# Patient Record
Sex: MALE | Race: WHITE | NOT HISPANIC OR LATINO | Employment: OTHER | URBAN - METROPOLITAN AREA
[De-identification: names, ages, dates, MRNs, and addresses within clinical notes are randomized per-mention and may not be internally consistent; named-entity substitution may affect disease eponyms.]

---

## 2018-03-08 NOTE — PATIENT DISCUSSION
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 Only the part of the retina drained by this branch vein is damaged.  Vision loss varies.

## 2018-03-08 NOTE — PATIENT DISCUSSION
Carotid study, Retinal vein and artery dopplers order given to pt for [facility] AT [facility] with Dr. Cali Cervantes.

## 2018-03-08 NOTE — PATIENT DISCUSSION
Pt stated he has recently lost about 40lbs over the last year.  Encouraged pt to continue with the weight loss.

## 2018-03-27 ENCOUNTER — NEW PATIENT COMPREHENSIVE (OUTPATIENT)
Dept: URBAN - METROPOLITAN AREA CLINIC 39 | Facility: CLINIC | Age: 63
End: 2018-03-27

## 2018-03-27 VITALS
DIASTOLIC BLOOD PRESSURE: 87 MMHG | SYSTOLIC BLOOD PRESSURE: 129 MMHG | HEART RATE: 71 BPM | HEIGHT: 60 IN | RESPIRATION RATE: 14 BRPM

## 2018-03-27 DIAGNOSIS — H18.51: ICD-10-CM

## 2018-03-27 DIAGNOSIS — H25.811: ICD-10-CM

## 2018-03-27 DIAGNOSIS — H25.812: ICD-10-CM

## 2018-03-27 PROCEDURE — 99204 OFFICE O/P NEW MOD 45 MIN: CPT

## 2018-03-27 PROCEDURE — 92136TC INTERFEROMETRY - TECHNICAL COMPONENT

## 2018-03-27 PROCEDURE — 92015 DETERMINE REFRACTIVE STATE: CPT

## 2018-03-27 PROCEDURE — 92025 CPTRIZED CORNEAL TOPOGRAPHY: CPT

## 2018-03-27 RX ORDER — PREDNISOLONE ACETATE 10 MG/ML: 1 SUSPENSION/ DROPS OPHTHALMIC

## 2018-03-27 RX ORDER — MOXIFLOXACIN HYDROCHLORIDE 5 MG/ML: 1 SOLUTION/ DROPS OPHTHALMIC

## 2018-03-27 RX ORDER — NEPAFENAC 3 MG/ML: 1 SUSPENSION/ DROPS OPHTHALMIC ONCE A DAY

## 2018-03-27 ASSESSMENT — VISUAL ACUITY
OD_SC: < 12
OD_SC: 20/400
OS_GLARE: 20/400
OS_CC: 20/50-1
OS_SC: < 12
OD_CC: J8
OS_SC: 20/70
OD_CC: 20/100+1
OS_CC: J5
OD_GLARE: 20/400

## 2018-03-27 ASSESSMENT — TONOMETRY
OD_IOP_MMHG: 15
OS_IOP_MMHG: 15

## 2018-04-19 NOTE — PATIENT DISCUSSION
Could be due to shift in cataract and possible need for new Rx glasses. Recommend coming in for refraction with [facility] in a few weeks due to OD not available.

## 2018-04-19 NOTE — PATIENT DISCUSSION
Pt edu there is no evidence of edema on OCT or Exam today. Need to get to the bottom as to why vision has gone down, could be due to the need for new Glasses Rx. Will follow up in 1 month for OCT/Possible FA again to assess if there is any more damage from the vein blockage. Edu patient that the retina was damaged from when the vein was blocked so will have some vision loss.

## 2018-05-16 ENCOUNTER — SURGERY/PROCEDURE (OUTPATIENT)
Dept: URBAN - METROPOLITAN AREA CLINIC 39 | Facility: CLINIC | Age: 63
End: 2018-05-16

## 2018-05-16 DIAGNOSIS — T85.398A: ICD-10-CM

## 2018-05-16 DIAGNOSIS — H25.811: ICD-10-CM

## 2018-05-16 DIAGNOSIS — H18.51: ICD-10-CM

## 2018-05-16 PROCEDURE — 66250 FOLLOW-UP SURGERY OF EYE: CPT

## 2018-05-16 PROCEDURE — 66020 INJECTION TREATMENT OF EYE: CPT

## 2018-05-16 PROCEDURE — 65756 CORNEAL TRNSPL ENDOTHELIAL: CPT

## 2018-05-16 PROCEDURE — 66984 XCAPSL CTRC RMVL W/O ECP: CPT

## 2018-05-17 ENCOUNTER — CATARACT POST-OP 1-DAY (OUTPATIENT)
Dept: URBAN - METROPOLITAN AREA CLINIC 39 | Facility: CLINIC | Age: 63
End: 2018-05-17

## 2018-05-17 DIAGNOSIS — Z94.7: ICD-10-CM

## 2018-05-17 PROCEDURE — 99024 POSTOP FOLLOW-UP VISIT: CPT

## 2018-05-17 ASSESSMENT — TONOMETRY
OD_IOP_MMHG: 11
OS_IOP_MMHG: 10

## 2018-05-17 ASSESSMENT — VISUAL ACUITY
OS_PH: 20/40
OD_SC: CF 1FT
OS_SC: 20/60
OD_PH: 20/200

## 2018-05-17 NOTE — PATIENT DISCUSSION
Pt edu stable, no blood or fluid. No treatment indicated at this point. Discussed to importance of BP med compliance and following up with PCP to manage BP. S/S of worsening discussed. Advised to call with any vision changes.

## 2018-05-17 NOTE — PATIENT DISCUSSION
Pt edu main reason for decreased/poor vision in OS is due to the fact that he needs a new Rx. Refraction today brought his vision from 20/200 to 20/40. Recommend next available with [facility] for possible change in Rx glasses.

## 2018-05-22 ENCOUNTER — POST-OP CATARACT (OUTPATIENT)
Dept: URBAN - METROPOLITAN AREA CLINIC 39 | Facility: CLINIC | Age: 63
End: 2018-05-22

## 2018-05-22 DIAGNOSIS — Z94.7: ICD-10-CM

## 2018-05-22 PROCEDURE — 99024 POSTOP FOLLOW-UP VISIT: CPT

## 2018-05-22 ASSESSMENT — VISUAL ACUITY
OD_PH: 20/100-1
OS_SC: J12
OS_SC: 20/60
OD_SC: <J12
OD_SC: 20/200+1

## 2018-05-22 ASSESSMENT — TONOMETRY
OD_IOP_MMHG: 11
OS_IOP_MMHG: 11

## 2018-07-09 ENCOUNTER — POST OP/EVAL OF SECOND EYE (OUTPATIENT)
Dept: URBAN - METROPOLITAN AREA CLINIC 39 | Facility: CLINIC | Age: 63
End: 2018-07-09

## 2018-07-09 DIAGNOSIS — Z94.7: ICD-10-CM

## 2018-07-09 DIAGNOSIS — H25.812: ICD-10-CM

## 2018-07-09 DIAGNOSIS — H18.51: ICD-10-CM

## 2018-07-09 PROCEDURE — 92012 INTRM OPH EXAM EST PATIENT: CPT

## 2018-07-09 RX ORDER — FLUOROMETHOLONE 1 MG/ML
1 SOLUTION/ DROPS OPHTHALMIC ONCE A DAY
Start: 2018-07-09

## 2018-07-09 ASSESSMENT — VISUAL ACUITY
OD_SC: J8
OD_PH: 20/25
OD_SC: 20/50
OS_PH: 20/40
OS_SC: 20/80
OS_SC: <J12
OS_BAT: 20/400

## 2018-07-09 ASSESSMENT — TONOMETRY
OS_IOP_MMHG: 12
OD_IOP_MMHG: 10

## 2018-07-11 ENCOUNTER — SURGERY/PROCEDURE (OUTPATIENT)
Dept: URBAN - METROPOLITAN AREA CLINIC 39 | Facility: CLINIC | Age: 63
End: 2018-07-11

## 2018-07-11 DIAGNOSIS — H18.51: ICD-10-CM

## 2018-07-11 DIAGNOSIS — H25.812: ICD-10-CM

## 2018-07-11 PROCEDURE — 66984 XCAPSL CTRC RMVL W/O ECP: CPT

## 2018-07-11 PROCEDURE — 65756 CORNEAL TRNSPL ENDOTHELIAL: CPT

## 2018-07-12 ENCOUNTER — CATARACT POST-OP 1-DAY (OUTPATIENT)
Dept: URBAN - METROPOLITAN AREA CLINIC 39 | Facility: CLINIC | Age: 63
End: 2018-07-12

## 2018-07-12 DIAGNOSIS — Z94.7: ICD-10-CM

## 2018-07-12 PROCEDURE — 99024 POSTOP FOLLOW-UP VISIT: CPT

## 2018-07-12 ASSESSMENT — TONOMETRY
OD_IOP_MMHG: 11
OS_IOP_MMHG: 17

## 2018-07-12 ASSESSMENT — VISUAL ACUITY: OD_SC: 20/50

## 2018-07-17 ENCOUNTER — POST-OP (OUTPATIENT)
Dept: URBAN - METROPOLITAN AREA CLINIC 39 | Facility: CLINIC | Age: 63
End: 2018-07-17

## 2018-07-17 DIAGNOSIS — H18.51: ICD-10-CM

## 2018-07-17 DIAGNOSIS — Z94.7: ICD-10-CM

## 2018-07-17 PROCEDURE — 99024 POSTOP FOLLOW-UP VISIT: CPT

## 2018-07-17 PROCEDURE — 92025 CPTRIZED CORNEAL TOPOGRAPHY: CPT

## 2018-07-17 PROCEDURE — 92286 ANT SGM IMG I&R SPECLR MIC: CPT

## 2018-07-17 PROCEDURE — 92134 CPTRZ OPH DX IMG PST SGM RTA: CPT

## 2018-07-17 ASSESSMENT — TONOMETRY
OS_IOP_MMHG: 13
OD_IOP_MMHG: 12

## 2018-07-17 ASSESSMENT — VISUAL ACUITY
OD_SC: J12
OS_SC: J12
OS_SC: 20/100
OU_SC: 20/50-1
OD_SC: 20/50-1

## 2018-10-30 ENCOUNTER — FOLLOW UP (OUTPATIENT)
Dept: URBAN - METROPOLITAN AREA CLINIC 39 | Facility: CLINIC | Age: 63
End: 2018-10-30

## 2018-10-30 DIAGNOSIS — H04.123: ICD-10-CM

## 2018-10-30 DIAGNOSIS — Z94.7: ICD-10-CM

## 2018-10-30 DIAGNOSIS — H26.493: ICD-10-CM

## 2018-10-30 DIAGNOSIS — H18.51: ICD-10-CM

## 2018-10-30 PROCEDURE — 92286 ANT SGM IMG I&R SPECLR MIC: CPT

## 2018-10-30 PROCEDURE — 92012 INTRM OPH EXAM EST PATIENT: CPT

## 2018-10-30 ASSESSMENT — VISUAL ACUITY
OS_GLARE: 20/60
OD_SC: 20/30
OS_SC: 20/40+1
OD_GLARE: 20/70

## 2018-10-30 ASSESSMENT — TONOMETRY
OS_IOP_MMHG: 12
OD_IOP_MMHG: 10

## 2018-11-15 ENCOUNTER — SURGERY/PROCEDURE (OUTPATIENT)
Dept: URBAN - METROPOLITAN AREA SURGERY 14 | Facility: SURGERY | Age: 63
End: 2018-11-15

## 2018-11-15 DIAGNOSIS — H26.493: ICD-10-CM

## 2018-11-15 PROCEDURE — 6682150 YAG CAPSULOTOMY

## 2018-11-19 ENCOUNTER — YAG POST-OP (OUTPATIENT)
Dept: URBAN - METROPOLITAN AREA CLINIC 39 | Facility: CLINIC | Age: 63
End: 2018-11-19

## 2018-11-19 DIAGNOSIS — Z98.890: ICD-10-CM

## 2018-11-19 PROCEDURE — 99024 POSTOP FOLLOW-UP VISIT: CPT

## 2018-11-19 ASSESSMENT — TONOMETRY
OS_IOP_MMHG: 12
OD_IOP_MMHG: 12

## 2018-11-19 ASSESSMENT — VISUAL ACUITY
OD_SC: 20/30-2
OS_CC: J2
OS_SC: 20/40-1
OD_CC: J3
OS_SC: J5
OD_SC: J10

## 2019-11-20 ENCOUNTER — ESTABLISHED COMPREHENSIVE EXAM (OUTPATIENT)
Dept: URBAN - METROPOLITAN AREA CLINIC 39 | Facility: CLINIC | Age: 64
End: 2019-11-20

## 2019-11-20 DIAGNOSIS — H04.123: ICD-10-CM

## 2019-11-20 DIAGNOSIS — H43.813: ICD-10-CM

## 2019-11-20 PROCEDURE — 92015 DETERMINE REFRACTIVE STATE: CPT

## 2019-11-20 PROCEDURE — 92014 COMPRE OPH EXAM EST PT 1/>: CPT

## 2019-11-20 ASSESSMENT — VISUAL ACUITY
OD_SC: 20/40+2
OS_CC: J1
OD_SC: <J12
OS_SC: 20/50-1
OU_SC: 20/30-2
OD_CC: J3
OS_SC: J3
OU_CC: J1
OU_SC: J2

## 2019-11-20 ASSESSMENT — TONOMETRY
OD_IOP_MMHG: 13
OS_IOP_MMHG: 13

## 2020-06-18 NOTE — PATIENT DISCUSSION
*******Exam not completed due to patient coughing excessively Per: [facility] pt to cancel today and reschedule once Covid testing has been done. *********.

## 2020-11-25 ENCOUNTER — ESTABLISHED COMPREHENSIVE EXAM (OUTPATIENT)
Dept: URBAN - METROPOLITAN AREA CLINIC 39 | Facility: CLINIC | Age: 65
End: 2020-11-25

## 2020-11-25 DIAGNOSIS — H02.834: ICD-10-CM

## 2020-11-25 DIAGNOSIS — H43.813: ICD-10-CM

## 2020-11-25 DIAGNOSIS — H52.4: ICD-10-CM

## 2020-11-25 DIAGNOSIS — H04.123: ICD-10-CM

## 2020-11-25 DIAGNOSIS — H52.203: ICD-10-CM

## 2020-11-25 DIAGNOSIS — H52.01: ICD-10-CM

## 2020-11-25 DIAGNOSIS — H02.831: ICD-10-CM

## 2020-11-25 PROCEDURE — 92015 DETERMINE REFRACTIVE STATE: CPT

## 2020-11-25 PROCEDURE — 92014 COMPRE OPH EXAM EST PT 1/>: CPT

## 2020-11-25 PROCEDURE — 92499OP2 OPTOMAP RETINAL SCREENING BOTH EYES

## 2020-11-25 ASSESSMENT — TONOMETRY
OD_IOP_MMHG: 12
OS_IOP_MMHG: 13

## 2020-11-25 ASSESSMENT — VISUAL ACUITY
OS_SC: J3
OD_SC: J10
OU_SC: 20/40+2
OD_CC: J2
OD_SC: 20/40+2
OS_CC: J1+
OS_SC: 20/50-2

## 2021-02-03 NOTE — PATIENT DISCUSSION
We reviewed the signs and symptoms of retinal tear/retinal detachment and the importance of prompt evaluation should there be increasing floaters, new flashing lights, or decreasing peripheral vision in either eye at any time. Thalidomide Pregnancy And Lactation Text: This medication is Pregnancy Category X and is absolutely contraindicated during pregnancy. It is unknown if it is excreted in breast milk.

## 2021-07-07 NOTE — PATIENT DISCUSSION
Low risk for malignant transformation. Lesion appears to have been present for many years. Satisfactory

## 2022-01-07 ENCOUNTER — COMPREHENSIVE EXAM (OUTPATIENT)
Dept: URBAN - METROPOLITAN AREA CLINIC 39 | Facility: CLINIC | Age: 67
End: 2022-01-07

## 2022-01-07 DIAGNOSIS — H52.4: ICD-10-CM

## 2022-01-07 DIAGNOSIS — H52.203: ICD-10-CM

## 2022-01-07 DIAGNOSIS — H52.01: ICD-10-CM

## 2022-01-07 DIAGNOSIS — H43.813: ICD-10-CM

## 2022-01-07 DIAGNOSIS — H52.12: ICD-10-CM

## 2022-01-07 DIAGNOSIS — H02.834: ICD-10-CM

## 2022-01-07 DIAGNOSIS — H04.123: ICD-10-CM

## 2022-01-07 DIAGNOSIS — H02.831: ICD-10-CM

## 2022-01-07 PROCEDURE — 92014 COMPRE OPH EXAM EST PT 1/>: CPT

## 2022-01-07 PROCEDURE — 92015 DETERMINE REFRACTIVE STATE: CPT

## 2022-01-07 ASSESSMENT — TONOMETRY
OS_IOP_MMHG: 13
OD_IOP_MMHG: 15

## 2022-01-07 ASSESSMENT — VISUAL ACUITY
OD_SC: 20/30-2
OD_SC: J10
OS_SC: J3
OS_SC: 20/40+1
OS_CC: J1
OD_CC: J3

## 2023-01-06 ENCOUNTER — COMPREHENSIVE EXAM (OUTPATIENT)
Dept: URBAN - METROPOLITAN AREA CLINIC 39 | Facility: CLINIC | Age: 68
End: 2023-01-06

## 2023-01-06 DIAGNOSIS — H52.01: ICD-10-CM

## 2023-01-06 DIAGNOSIS — H52.12: ICD-10-CM

## 2023-01-06 DIAGNOSIS — H52.203: ICD-10-CM

## 2023-01-06 DIAGNOSIS — H02.834: ICD-10-CM

## 2023-01-06 DIAGNOSIS — H02.831: ICD-10-CM

## 2023-01-06 DIAGNOSIS — H43.813: ICD-10-CM

## 2023-01-06 DIAGNOSIS — H04.123: ICD-10-CM

## 2023-01-06 DIAGNOSIS — H52.4: ICD-10-CM

## 2023-01-06 PROCEDURE — 92014 COMPRE OPH EXAM EST PT 1/>: CPT

## 2023-01-06 PROCEDURE — 92015 DETERMINE REFRACTIVE STATE: CPT

## 2023-01-06 ASSESSMENT — VISUAL ACUITY
OD_SC: 20/20-1
OS_SC: 20/20-1
OD_SC: J12
OS_SC: J6

## 2023-01-06 ASSESSMENT — TONOMETRY
OD_IOP_MMHG: 12
OS_IOP_MMHG: 14

## 2024-01-09 ENCOUNTER — COMPREHENSIVE EXAM (OUTPATIENT)
Dept: URBAN - METROPOLITAN AREA CLINIC 39 | Facility: CLINIC | Age: 69
End: 2024-01-09

## 2024-01-09 DIAGNOSIS — H02.831: ICD-10-CM

## 2024-01-09 DIAGNOSIS — H52.4: ICD-10-CM

## 2024-01-09 DIAGNOSIS — H43.813: ICD-10-CM

## 2024-01-09 DIAGNOSIS — H52.01: ICD-10-CM

## 2024-01-09 DIAGNOSIS — H04.123: ICD-10-CM

## 2024-01-09 DIAGNOSIS — H52.12: ICD-10-CM

## 2024-01-09 DIAGNOSIS — H52.203: ICD-10-CM

## 2024-01-09 DIAGNOSIS — H02.834: ICD-10-CM

## 2024-01-09 PROCEDURE — 92015 DETERMINE REFRACTIVE STATE: CPT

## 2024-01-09 PROCEDURE — 92014 COMPRE OPH EXAM EST PT 1/>: CPT

## 2024-01-09 ASSESSMENT — VISUAL ACUITY
OD_SC: 20/20
OS_SC: J1+
OU_SC: 20/20
OS_SC: 20/40-1
OU_SC: J1+
OS_PH: 20/20
OD_SC: J12

## 2024-01-09 ASSESSMENT — TONOMETRY
OS_IOP_MMHG: 11
OD_IOP_MMHG: 12

## 2025-01-09 ENCOUNTER — COMPREHENSIVE EXAM (OUTPATIENT)
Age: 70
End: 2025-01-09

## 2025-01-09 DIAGNOSIS — H43.813: ICD-10-CM

## 2025-01-09 DIAGNOSIS — H02.834: ICD-10-CM

## 2025-01-09 DIAGNOSIS — H52.01: ICD-10-CM

## 2025-01-09 DIAGNOSIS — H02.831: ICD-10-CM

## 2025-01-09 DIAGNOSIS — H04.123: ICD-10-CM

## 2025-01-09 DIAGNOSIS — H52.4: ICD-10-CM

## 2025-01-09 DIAGNOSIS — H52.12: ICD-10-CM

## 2025-01-09 PROCEDURE — 92015 DETERMINE REFRACTIVE STATE: CPT

## 2025-01-09 PROCEDURE — 92014 COMPRE OPH EXAM EST PT 1/>: CPT
